# Patient Record
(demographics unavailable — no encounter records)

---

## 2025-03-06 NOTE — REVIEW OF SYSTEMS
[Sleep Disturbances] : sleep disturbances [Hot Flashes] : hot flashes [Negative] : Heme/Lymph [Abn Vaginal bleeding] : no abnormal vaginal bleeding [FreeTextEntry8] : +lump right inguinal area

## 2025-03-06 NOTE — DISCUSSION/SUMMARY
[FreeTextEntry1] : Well appearing perimenopausal female is here for gyn exam; c/o vasomotor symptoms, trouble sleeping and being forgetful, LMP was 10 months ago. Also noticed right inguinal lump, painful to touch and with movement of her right leg s/p angiogram.   - Discussed menopause symptoms, reviewed medical management vs supplements; patient would need clearance from heart transplant team for HRT; she is already on Paxil, discussed increasing dose. Information for non-hormonal options provided to patient, as well as options to help wtih sleep (melatonin, unisom) and brain fog (Neuriva)  Right inguinal lump/mass --> will send for pelvic sonogram for further evaluation, possible hematoma? no skin discoloration or swelling noted on exam. Advised follow up with Cardiologist/ Interventional radiologist  HCM - PAP done  - Rx for mammogram provided - UTD with colon cancer screening - Healthy diet and routine exercise recommended - Self breast awareness advised   RTO in 1 year for Annual gyn exam and PRN

## 2025-03-06 NOTE — COUNSELING
[Nutrition/ Exercise/ Weight Management] : nutrition, exercise, weight management [Vitamins/Supplements] : vitamins/supplements [Breast Self Exam] : breast self exam [FreeTextEntry2] : Patient was counseled on the following: Diet, exercise and lifestyle changes can reduce the symptoms and complications of menopause. The following recommendations are appropriate for all women who are approaching menopause or who are in menopause.  - Refrain from smoking. Smoking increases the risk of osteoporosis and hip fractures. Smoking also increases the risk of heart attack and stroke. - Limit caffeine. High caffeine intake, more than three cups per day, can aggravate hot flashes and may contribute to osteoporosis. - Wear layers of loose fit clothing that can be easily removed, if necessary. Keep bed blankets light and use layers at night for the same reason. - Exercise. Exercise can:  reduce blood pressure and the risk of heart attack and stroke, relieve hot flashes in some women, reduce osteoporosis and fractures. - Exercise to prevent weak or thin bones must be weight-bearing exercise such as walking, low-impact aerobics, dancing, lifting weights, or playing a racquet sport such as tennis or paddle ball. - Get adequate sunlight and vitamin D.  - Consume calcium. Women should get between 800 to 1,500 milligrams of calcium every day. - Treat vaginal dryness. Lubricants such as K-Y Lubricant can help with dryness during sex. Vaginal moisturizers such as Replens, Revaree, or coconut oil can help to treat irritation due to dryness. Also reviewed hormonal cream if over the counter treatments do not work.

## 2025-03-06 NOTE — PHYSICAL EXAM
[Appropriately responsive] : appropriately responsive [Alert] : alert [No Acute Distress] : no acute distress [No Lymphadenopathy] : no lymphadenopathy [Soft] : soft [Non-tender] : non-tender [Non-distended] : non-distended [No HSM] : No HSM [No Lesions] : no lesions [No Mass] : no mass [Oriented x3] : oriented x3 [FreeTextEntry5] : Respirations even and unlabored. no dyspnea.  [FreeTextEntry6] :  symmetric bilaterally, no palpable masses, no skin changes or dimpling, no nipple discharge, no adenopathy [FreeTextEntry1] : Labia/Clitoris: Normal, no lesions Vagina: Normal, dry mucosa, no lesions visible or palpable. no abnormal discharge Cervix: Smooth, pink, no lesions. No cervical motion tenderness. Pap Collected Uterus: normal size, mid-position, mobile, nontender. Adnexa: No palpable adnexal masses, nontender bilaterally. Inguinal: palpable firm mass of right inguinal area; approx 1-2cm, fixed, +tenderness, left inguinal area wnl.

## 2025-03-06 NOTE — HISTORY OF PRESENT ILLNESS
[Patient reported PAP Smear was normal] : Patient reported PAP Smear was normal [Patient reported mammogram was normal] : Patient reported mammogram was normal [Y] : Patient reports abnormal menses [Irregular Duration] : has been irregular [FreeTextEntry1] : 53-year-old female is here for a routine gynecological exam;  pmhx significant for heart transplant. She c/o "lump" in her right groin area that is uncomfortable, especially when she moves her right leg, she reports having had an angiogram the went through her femoral artery 3 weeks ago, she denies skin discoloration, swelling, or bleeding at site. She states her LMP was around June 2024, has not had any vaginal bleeding since. Reports occasional hot flashes, sleep disturbances, and being forgetful, would like to discuss menopause treatments.  [Mammogramdate] : 2024 [PapSmeardate] : 2023 [BoneDensityDate] : 2024 [ColonoscopyDate] : 2023 [TextBox_102] : Menarche age 12yr [LMPDate] : 06/2024 [[facility] [place]] : [facility] [PGHxAbortions] : 1 [[facility]] : 3

## 2025-03-06 NOTE — HISTORY OF PRESENT ILLNESS
[Patient reported PAP Smear was normal] : Patient reported PAP Smear was normal [Patient reported mammogram was normal] : Patient reported mammogram was normal [Y] : Patient reports abnormal menses [Irregular Duration] : has been irregular [FreeTextEntry1] : 53-year-old female is here for a routine gynecological exam;  pmhx significant for heart transplant. She c/o "lump" in her right groin area that is uncomfortable, especially when she moves her right leg, she reports having had an angiogram the went through her femoral artery 3 weeks ago, she denies skin discoloration, swelling, or bleeding at site. She states her LMP was around June 2024, has not had any vaginal bleeding since. Reports occasional hot flashes, sleep disturbances, and being forgetful, would like to discuss menopause treatments.  [Mammogramdate] : 2024 [PapSmeardate] : 2023 [BoneDensityDate] : 2024 [ColonoscopyDate] : 2023 [TextBox_102] : Menarche age 12yr [LMPDate] : 06/2024 [facility] [PGHxAbortions] : 1 [[facility]

## 2025-04-05 NOTE — HISTORY OF PRESENT ILLNESS
[de-identified] : Age: 54 year F PMHx: HTN, HLD Hand Dominance: RHD Chief Complaint: Right wrist pain onset late January 2025 with NKI. Patient reports constant pain in the base of her right thumb, worse with fine motor movements. 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 Denies numbness/tingling.  Trauma: NKI Outside Imaging/Treatment: radiographs from 02/11/25 OTC Medications: Tylenol and Arthritis cream with minor relief OT/PT: none Bracing: none Pain worse with: fine motor movements Pain better with: rest

## 2025-04-05 NOTE — HISTORY OF PRESENT ILLNESS
[de-identified] : Age: 54 year F PMHx: HTN, HLD Hand Dominance: RHD Chief Complaint: Right wrist pain onset late January 2025 with NKI. Patient reports constant pain in the base of her right thumb, worse with fine motor movements. 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 Denies numbness/tingling.  Trauma: NKI Outside Imaging/Treatment: radiographs from 02/11/25 OTC Medications: Tylenol and Arthritis cream with minor relief OT/PT: none Bracing: none Pain worse with: fine motor movements Pain better with: rest

## 2025-04-05 NOTE — ASSESSMENT
[FreeTextEntry1] : EXAM Right wrist with mild swelling at radial styloid, no erythema, skin intact. +ttp at radial styloid, +Finkelstein's. -ttp at thumb CMC, MP, IP and A1 pulley. Able to make fist, oppose thumb to small finger and abduct fingers. Sensation intact throughout. <2sec cap refill.  Right wrist radiographs without fracture nor dislocation. Carpus aligned. Thumb CMC arthritis. (3-view as viewed by me from outside facility)  ASSESSMENT & PLAN Right DeQuervain's Tenosynovitis - reviewed pathoanatomy with patient and discussed treatment ladder including NSAIDs prn, bracing, activity modification, CSI and 1st dorsal compartment release. After discussion of risks/benefits, including glucose intolerance in the diabetic population, patient elected to proceed with CSI to the first dorsal extensor compartment.  Procedure 1 - 0.5cc 1% lidocaine + 0.5cc 40mg/cc Kenalog administered to the right first dorsal extensor compartment following sterilization with Betadine. Patient tolerated this well.  F/u 3months
[FreeTextEntry1] : EXAM Right wrist with mild swelling at radial styloid, no erythema, skin intact. +ttp at radial styloid, +Finkelstein's. -ttp at thumb CMC, MP, IP and A1 pulley. Able to make fist, oppose thumb to small finger and abduct fingers. Sensation intact throughout. <2sec cap refill.  Right wrist radiographs without fracture nor dislocation. Carpus aligned. Thumb CMC arthritis. (3-view as viewed by me from outside facility)  ASSESSMENT & PLAN Right DeQuervain's Tenosynovitis - reviewed pathoanatomy with patient and discussed treatment ladder including NSAIDs prn, bracing, activity modification, CSI and 1st dorsal compartment release. After discussion of risks/benefits, including glucose intolerance in the diabetic population, patient elected to proceed with CSI to the first dorsal extensor compartment.  Procedure 1 - 0.5cc 1% lidocaine + 0.5cc 40mg/cc Kenalog administered to the right first dorsal extensor compartment following sterilization with Betadine. Patient tolerated this well.  F/u 3months
- - -